# Patient Record
Sex: FEMALE | Race: WHITE | NOT HISPANIC OR LATINO | Employment: FULL TIME | URBAN - METROPOLITAN AREA
[De-identification: names, ages, dates, MRNs, and addresses within clinical notes are randomized per-mention and may not be internally consistent; named-entity substitution may affect disease eponyms.]

---

## 2018-01-27 ENCOUNTER — OFFICE VISIT (OUTPATIENT)
Dept: URGENT CARE | Facility: CLINIC | Age: 33
End: 2018-01-27

## 2018-01-27 VITALS
HEART RATE: 86 BPM | SYSTOLIC BLOOD PRESSURE: 90 MMHG | WEIGHT: 123 LBS | OXYGEN SATURATION: 99 % | TEMPERATURE: 99 F | DIASTOLIC BLOOD PRESSURE: 60 MMHG | HEIGHT: 66 IN | BODY MASS INDEX: 19.77 KG/M2

## 2018-01-27 DIAGNOSIS — R11.0 NAUSEA: ICD-10-CM

## 2018-01-27 DIAGNOSIS — Z34.90 PREGNANCY, UNSPECIFIED GESTATIONAL AGE: ICD-10-CM

## 2018-01-27 DIAGNOSIS — J02.9 PHARYNGITIS, UNSPECIFIED ETIOLOGY: Primary | ICD-10-CM

## 2018-01-27 LAB
B-HCG UR QL: POSITIVE
CTP QC/QA: YES
FLUAV AG NPH QL: NEGATIVE
FLUBV AG NPH QL: NEGATIVE
S PYO RRNA THROAT QL PROBE: NEGATIVE

## 2018-01-27 PROCEDURE — 99203 OFFICE O/P NEW LOW 30 MIN: CPT | Mod: 25,S$GLB,, | Performed by: FAMILY MEDICINE

## 2018-01-27 PROCEDURE — 87804 INFLUENZA ASSAY W/OPTIC: CPT | Mod: QW,S$GLB,, | Performed by: FAMILY MEDICINE

## 2018-01-27 PROCEDURE — 81025 URINE PREGNANCY TEST: CPT | Mod: S$GLB,,, | Performed by: FAMILY MEDICINE

## 2018-01-27 PROCEDURE — 87880 STREP A ASSAY W/OPTIC: CPT | Mod: QW,S$GLB,, | Performed by: FAMILY MEDICINE

## 2018-01-27 RX ORDER — AZITHROMYCIN 500 MG/1
500 TABLET, FILM COATED ORAL DAILY
COMMUNITY

## 2018-01-27 RX ORDER — DIMENHYDRINATE 50 MG
50 TABLET ORAL NIGHTLY PRN
COMMUNITY

## 2018-01-27 RX ORDER — METOCLOPRAMIDE 5 MG/1
5 TABLET ORAL 3 TIMES DAILY
Qty: 30 TABLET | Refills: 1 | Status: SHIPPED | OUTPATIENT
Start: 2018-01-27 | End: 2019-01-27

## 2018-01-27 NOTE — PROGRESS NOTES
"Subjective:       Patient ID: Roya Avila is a 32 y.o. female.    Vitals:  height is 5' 6" (1.676 m) and weight is 55.8 kg (123 lb). Her temperature is 98.6 °F (37 °C). Her blood pressure is 90/60 and her pulse is 86. Her oxygen saturation is 99%.     Chief Complaint: Sore Throat and Nausea    [place] lives in [place] and here on vacation and arrived on Thursday, pt  stated wife started with sore throat on Thursday with fever, nausea, [place] and [place] took 3 does of it and finished it yesterday, pt also took a pregnancy test and it was positive so she would also like to take another one in clinic       Sore Throat    This is a new problem. The problem has been unchanged. Pertinent negatives include no abdominal pain, diarrhea, headaches, shortness of breath or vomiting. She has tried acetaminophen for the symptoms. The treatment provided no relief.   Nausea   The current episode started in the past 7 days. The problem occurs intermittently. Associated symptoms include nausea and a sore throat. Pertinent negatives include no abdominal pain, chest pain, chills, headaches, rash or vomiting.     Review of Systems   Constitution: Negative for chills.   HENT: Positive for sore throat.    Eyes: Negative for blurred vision.   Cardiovascular: Negative for chest pain.   Respiratory: Negative for shortness of breath.    Skin: Negative for rash.   Musculoskeletal: Negative for back pain and joint pain.   Gastrointestinal: Positive for nausea. Negative for abdominal pain, diarrhea and vomiting.   Neurological: Negative for headaches.   Psychiatric/Behavioral: The patient is not nervous/anxious.        Objective:      Physical Exam   Constitutional: She is oriented to person, place, and time. She appears well-developed and well-nourished. She is cooperative.  Non-toxic appearance. She does not appear ill. No distress.   HENT:   Head: Normocephalic and atraumatic.   Right Ear: Hearing, " tympanic membrane, external ear and ear canal normal.   Left Ear: Hearing, tympanic membrane, external ear and ear canal normal.   Nose: Nose normal. No mucosal edema, rhinorrhea or nasal deformity. No epistaxis. Right sinus exhibits no maxillary sinus tenderness and no frontal sinus tenderness. Left sinus exhibits no maxillary sinus tenderness and no frontal sinus tenderness.   Mouth/Throat: Uvula is midline and mucous membranes are normal. No trismus in the jaw. Normal dentition. No uvula swelling. Posterior oropharyngeal erythema present.   Eyes: Conjunctivae and lids are normal. Right eye exhibits no discharge. Left eye exhibits no discharge. No scleral icterus.   Sclera clear bilat   Neck: Trachea normal, normal range of motion, full passive range of motion without pain and phonation normal. Neck supple.   Cardiovascular: Normal rate, regular rhythm, normal heart sounds, intact distal pulses and normal pulses.    Pulmonary/Chest: Effort normal and breath sounds normal. No respiratory distress.   Abdominal: Soft. Normal appearance and bowel sounds are normal. She exhibits no distension, no pulsatile midline mass and no mass. There is no tenderness.       Musculoskeletal: Normal range of motion. She exhibits no edema or deformity.   Neurological: She is alert and oriented to person, place, and time. She exhibits normal muscle tone. Coordination normal.   Skin: Skin is warm, dry and intact. She is not diaphoretic. No pallor.   Psychiatric: She has a normal mood and affect. Her speech is normal and behavior is normal. Judgment and thought content normal. Cognition and memory are normal.   Nursing note and vitals reviewed.      Assessment:       1. Pharyngitis, unspecified etiology    2. Nausea    3. Pregnancy, unspecified gestational age        Plan:         Pharyngitis, unspecified etiology  -     POCT rapid strep A  -     POCT Influenza A/B  -     Cancel: Pregnancy, urine rapid    Nausea  -     POCT urine  pregnancy    Pregnancy, unspecified gestational age    Other orders  -     metoclopramide HCl (REGLAN) 5 MG tablet; Take 1 tablet (5 mg total) by mouth 3 (three) times daily.  Dispense: 30 tablet; Refill: 1

## 2018-01-27 NOTE — PATIENT INSTRUCTIONS
When Your Child Has Pharyngitis or Tonsillitis    Your childs throat feels sore. This is likely because of redness and swelling (inflammation) of the throat. Two areas of the throat are most often affected: the pharynx and tonsils. Inflammation of the pharynx (pharyngitis) and inflammation of the tonsils (tonsillitis) are very common in children. This sheet tells you what you can do to relieve your childs throat pain.  What causes pharyngitis or tonsillitis?  Most commonly, pharyngitis and tonsillitis are caused by a viral or bacterial infection.  What are the symptoms of pharyngitis or tonsillitis?  The main symptom of both conditions is a sore throat. Your child may also have a fever, redness or swelling of the throat, and trouble swallowing. You may feel lumps in the neck.  How is pharyngitis or tonsillitis diagnosed?  The healthcare provider will examine your childs throat. The healthcare provider might wipe (swab) your childs throat. This swab will be tested for the bacteria that causes an infection called strep throat. If needed, a blood test can be done to check for a viral infection such as mononucleosis.  How is pharyngitis or tonsillitis treated?  If your childs sore throat is caused by a bacterial infection, the healthcare provider may prescribe antibiotics. Otherwise, you can treat your childs sore throat at home. To do this:  · Give your child acetaminophen or ibuprofen to ease the pain. Don't use ibuprofen in children younger than 6 months of age or in children who are dehydrated or vomiting all of the time. Dont give your child aspirin to relieve a fever. Using aspirin to treat a fever in children could cause a serious condition called Reye syndrome.  · Give your child cool liquids to drink.  · Have your child gargle with warm saltwater if it helps relieve pain. An over-the-counter throat numbing spray may also help.  What are the long-term concerns?  If your child has frequent sore throats,  take him or her to see a healthcare provider. Removing the tonsils may help relieve your childs recurring problems.  When to call your child's healthcare provider  Call your childs healthcare provider right away if your otherwise healthy child has any of the following:  · Fever (see Fever and children, below)  · Sore throat pain that persists for 2 to 3 days  · Sore throat with fever, headache, stomachache, or rash  · Trouble turning or straightening the head  · Problems swallowing or drooling  · Trouble breathing or needing to lean forward to breathe  · Problems opening mouth fully     Fever and children  Always use a digital thermometer to check your childs temperature. Never use a mercury thermometer.  For infants and toddlers, be sure to use a rectal thermometer correctly. A rectal thermometer may accidentally poke a hole in (perforate) the rectum. It may also pass on germs from the stool. Always follow the product makers directions for proper use. If you dont feel comfortable taking a rectal temperature, use another method. When you talk to your childs healthcare provider, tell him or her which method you used to take your childs temperature.  Here are guidelines for fever temperature. Ear temperatures arent accurate before 6 months of age. Dont take an oral temperature until your child is at least 4 years old.  Infant under 3 months old:  · Ask your childs healthcare provider how you should take the temperature.  · Rectal or forehead (temporal artery) temperature of 100.4°F (38°C) or higher, or as directed by the provider  · Armpit temperature of 99°F (37.2°C) or higher, or as directed by the provider  Child age 3 to 36 months:  · Rectal, forehead (temporal artery), or ear temperature of 102°F (38.9°C) or higher, or as directed by the provider  · Armpit temperature of 101°F (38.3°C) or higher, or as directed by the provider  Child of any age:  · Repeated temperature of 104°F (40°C) or higher, or as  directed by the provider  · Fever that lasts more than 24 hours in a child under 2 years old. Or a fever that lasts for 3 days in a child 2 years or older.   Date Last Reviewed: 11/1/2016 © 2000-2017 [employer]. [address] [place] [address], [place] [address]. All rights reserved. This information is not intended as a substitute for professional medical care. Always follow your healthcare professional's instructions.        Established Pregnancy, Normal Symptoms    You are pregnant and are having symptoms that worry you. During pregnancy, its normal to have many kinds of symptoms. Here is a list of common symptoms that happen during pregnancy.  Head and mouth  · Bleeding gums  · Dizziness and fainting  · Extra saliva  · Headaches  · Nosebleeds  · Skin color changes on your face  · Stuffy nose  · Mild blurriness of vision, especially if you wear contact lenses  Breasts  · Darkening of nipples  · Yellow or white discharge from the nipples  · Sore breasts and nipples  · Swollen breasts  Back, arms, and legs  · Back, hip, or thigh pain  · Leg cramps that come and go  · Numbness and tingling in your hands and fingers  · Swollen hands, legs, and feet  · Swollen leg veins  Belly (abdomen) and pelvis  · Constipation  · Feeling of pressure on your bladder and stomach  · Need to urinate often  · Gas and bloating  · Heartburn  · Anal itching, swelling, and bleeding (hemorrhoids)  · Leaking urine  · Mild pressure or cramping in your belly  · Nausea and vomiting throughout the day or night (morning sickness)  · Swollen belly  · Clear to white vaginal discharge  Other symptoms  · Dry, itchy skin  · Forgetfulness  · Less interest in sex  · Mood swings  · Tiredness  · Trouble sleeping  Home care  Here is information that may help relieve some common pregnancy symptoms.  Sore and swollen breasts  · Wear a support bra that fits properly.  Nausea and indigestion  · Eat smaller meals or snacks more often.  · Eat bland foods,  such as bananas, crackers, or rice.  · Stay away from spicy, fatty, or fried foods.  · Stay away from alcohol, caffeine, and tobacco.  · Dont lie down right after eating.  · Raise your head with pillows when you lie down.  · Eat foods or beverages that have ginger. If you drink ginger ale, be sure to make sure it has real ginger and not just ginger flavoring.  Leg swelling and varicose veins  · Wear elastic support hose. Put your feet up as often as possible.  Constipation  · Eat more fresh fruits and vegetables and more whole grains. Drink more clear liquids.  Joint and muscle pains  · Avoid heavy lifting.  · Pick things up by bending at your knees, not at your waist.  · Use acetaminophen for joint and muscle pain. Don't use aspirin, ibuprofen, or naproxen.  Mouth and nose dryness or bleeding  · Drink more liquids. Use a vaporizer or humidifier in your bedroom.  Dont take medicines or use remedies that your healthcare provider hasnt approved. If you have symptoms that are severe or sudden, call your healthcare provider.  Call 911  Call 911 if any of these occur:  · New chest, arm, shoulder, neck, or upper back pain  · Trouble breathing  · Severe belly pain or very heavy bleeding  · Severe lightheadedness, passing out, or fainting  · Rapid heart rate  · Confusion or difficulty waking up  When to seek medical advice  Call your healthcare provider right away if any of these occur:  · Burning or pain when you urinate  · Depression or severe anxiety  · Desire to eat or drink nonfood items such as paper, dirt, or cleaning products  · Diarrhea that lasts more than 24 hours  · Fast heartbeat or heart palpitations  · Fever of 100.4°F (38°C) or higher, or as directed by your healthcare provider  · You cant keep fluids down for 6 hours without vomiting  · Severe or ongoing vomiting  · Little or no urine  · Major vision changes  · Moderate or severe belly pain  · Severe back pain  · Severe constipation  · Severe cramping  or swelling in a leg, especially if its just on one side  · Severe headache  · Sudden swelling of your face, hands, feet, or ankles  · Vaginal bleeding  · Very itchy skin that doesnt get better  Date Last Reviewed: 10/1/2016  © 7117-7913 [employer]. [address] [place] [address]. All rights reserved. This information is not intended as a substitute for professional medical care. Always follow your healthcare professional's instructions.